# Patient Record
Sex: MALE | Race: WHITE | NOT HISPANIC OR LATINO | URBAN - METROPOLITAN AREA
[De-identification: names, ages, dates, MRNs, and addresses within clinical notes are randomized per-mention and may not be internally consistent; named-entity substitution may affect disease eponyms.]

---

## 2020-08-20 ENCOUNTER — EMERGENCY (EMERGENCY)
Facility: HOSPITAL | Age: 25
LOS: 0 days | Discharge: HOME | End: 2020-08-20
Attending: EMERGENCY MEDICINE | Admitting: EMERGENCY MEDICINE
Payer: COMMERCIAL

## 2020-08-20 VITALS
WEIGHT: 192.9 LBS | HEART RATE: 110 BPM | RESPIRATION RATE: 18 BRPM | DIASTOLIC BLOOD PRESSURE: 66 MMHG | OXYGEN SATURATION: 100 % | TEMPERATURE: 98 F | HEIGHT: 67 IN | SYSTOLIC BLOOD PRESSURE: 132 MMHG
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
## 2020-08-20 DIAGNOSIS — M79.632 PAIN IN LEFT FOREARM: ICD-10-CM
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
## 2020-08-20 DIAGNOSIS — S52.91XA UNSPECIFIED FRACTURE OF RIGHT FOREARM, INITIAL ENCOUNTER FOR CLOSED FRACTURE: ICD-10-CM
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
## 2020-08-20 PROCEDURE — 73130 X-RAY EXAM OF HAND: CPT | Mod: 26,RT

## 2020-08-20 PROCEDURE — 29125 APPL SHORT ARM SPLINT STATIC: CPT

## 2020-08-20 PROCEDURE — 99284 EMERGENCY DEPT VISIT MOD MDM: CPT | Mod: 25

## 2020-08-20 PROCEDURE — 73110 X-RAY EXAM OF WRIST: CPT | Mod: 26,RT

## 2020-08-20 PROCEDURE — 73080 X-RAY EXAM OF ELBOW: CPT | Mod: 26,RT

## 2020-08-20 PROCEDURE — 73090 X-RAY EXAM OF FOREARM: CPT | Mod: 26,RT

## 2020-08-20 NOTE — ED PROVIDER NOTE - CARE PROVIDER_API CALL
Tony Rico  ORTHOPAEDIC SURGERY  [address] [place] [address]  Phone: [phone]  Fax: [phone]  Follow Up Time: 1-3 Days

## 2020-08-20 NOTE — ED ADULT TRIAGE NOTE - CHIEF COMPLAINT QUOTE
Pt was exercising, slipped and fell, 185 lb weight fell on top of him, pt c/o R arm pain, no loc, no head injury. Pt was exercising, slipped and fell, 185 lb weight fell on top of him, pt c/o R arm pain, no loc, no head injury, palpable pulses.

## 2020-08-20 NOTE — ED ADULT NURSE NOTE - CHIEF COMPLAINT QUOTE
Pt was exercising, slipped and fell, 185 lb weight fell on top of him, pt c/o R arm pain, no loc, no head injury, palpable pulses.

## 2020-08-20 NOTE — ED PROVIDER NOTE - PHYSICAL EXAMINATION
Physical Exam    Vital Signs: I have reviewed the initial vital signs  Constitutional: well-nourished, appears stated age, no acute distress  EENT: Conjunctiva pink, Sclera clear, EOMI. Mucous membranes moist, no exudates or lesions noted, uvula midline. Non-tender lymph nodes  Cardiovascular: S1 and S2 present, regular rate, regular rhythm  Respiratory: unlabored respiratory effort, clear to auscultation bilaterally no wheezing, rales and rhonchi  Musculoskeletal: supple nontender neck, no midline tenderness. RUE: rp 2 +, distal ulnar deformity with swelling and ttp. able to move all fingers but guarded in wrist flex/ext and sup/pron. sensation intact. from in elbow, nontender  Integumentary: warm, dry, no rash  Psychiatric: appropriate mood, appropriate affect

## 2020-08-20 NOTE — ED PROVIDER NOTE - CLINICAL SUMMARY MEDICAL DECISION MAKING FREE TEXT BOX
pt presenting with RUE pain after accidentally pinning a weight against his arm while squatting today. No numbness/focal weakness no other complaints or injuries. 2+ equal pulses throughout. <2sec capillary refill throughout. no ttp R clavicle, R shoulder, R humerus, elbow. FROM. +ttp distal radius, no snuffbox tenderness. imaging reviewed. +fx. pt splinted. Comfortable with discharge and follow-up outpatient, strict return precautions given. Endorses understanding of all of this and aware that they can return at any time for new or concerning symptoms. No further questions or concerns at this time

## 2020-08-20 NOTE — ED PROVIDER NOTE - OBJECTIVE STATEMENT
24 year old male no past medical history p/w arm injury. patient was lifitng 185 back squat when his foot slipped from under him and barbell landed on distal right forearm. pain moderate, worse with movement, radiating from distal forearm to 4th and 5th digit, worsening. took motrin with midl rleief. no parestheisas, numbness, weakness. no head trauma/loc

## 2020-08-20 NOTE — ED PROVIDER NOTE - NS ED ROS FT
Review of Systems         Constitutional: (-) fever (-) chills (-) weakness       EENT: (-) sore throat       Cardiovascular: (-) chest pain (-) syncope       Respiratory: (-) cough, (-) shortness of breath       Gastrointestinal: (-) abdominal pain (-) vomiting (-) diarrhea (-) nausea       Musculoskeletal: (-) neck pain (-) back pain (+) joint pain       Integumentary: (-) rash       Neurological: (-) headache (-) altered mental status

## 2020-08-20 NOTE — ED PROVIDER NOTE - PATIENT PORTAL LINK FT
You can access the FollowMyHealth Patient Portal offered by [facility] by registering at the following website: [email or web address]. By joining [facility]’s FollowMyHealth portal, you will also be able to view your health information using other applications (apps) compatible with our system.

## 2020-08-21 NOTE — ED PROCEDURE NOTE - NS ED PERI NEURO NEG
Pre-application: Motor, sensory, and vascular responses intact in the injured extremity./Post-application: Motor, sensory, and vascular responses intact in the injured extremity./The patient/caregiver verbalized understanding of how to care for the injured extremity with splint
203.9

## 2024-09-14 ENCOUNTER — APPOINTMENT (OUTPATIENT)
Dept: ORTHOPEDIC SURGERY | Facility: CLINIC | Age: 29
End: 2024-09-14
Payer: SELF-PAY

## 2024-09-14 DIAGNOSIS — S93.402A SPRAIN OF UNSPECIFIED LIGAMENT OF LEFT ANKLE, INITIAL ENCOUNTER: ICD-10-CM

## 2024-09-14 PROBLEM — Z00.00 ENCOUNTER FOR PREVENTIVE HEALTH EXAMINATION: Status: ACTIVE | Noted: 2024-09-14

## 2024-09-14 PROCEDURE — 73630 X-RAY EXAM OF FOOT: CPT | Mod: LT

## 2024-09-14 PROCEDURE — 99203 OFFICE O/P NEW LOW 30 MIN: CPT

## 2024-09-14 PROCEDURE — 73610 X-RAY EXAM OF ANKLE: CPT | Mod: LT

## 2024-09-14 NOTE — DISCUSSION/SUMMARY
[de-identified] : Left ankle pain  HPI Patient is a 29-year old male accompanied by his mother reports to office for evaluation of left foot/ankle pain since 9/12/2024.  States that he was playing basketball and going up for a rebound when he twisted his left ankle on his way down.  Since then, walking, range of motion, palpating certain areas of the ankle aggravate the patient's pain at times.  Admits to mild swelling.  Denies any numbness or tingling.  Weightbearing left foot and ankle x-rays taken in office today reveal no obvious fractures, subluxations, or dislocations.  Mild soft tissue swelling noted.  Otherwise, no other significant abnormalities were seen.  Left ankle/foot exam is as follows: Mild swelling noted.  No erythema or ecchymosis.  Mild limited ROM of ankle secondary to swelling/pain.  TTP over deltoid and lateral ligaments.  No calf pain.  Light touch intact throughout.  Mild antalgic gait.  Ambulation with crutches  Assessment/plan Explained to the patient that he clinically has an ankle sprain.  Gentle ROM exercises and Epson salt and warm water was encouraged.  Explained to the patient that this may take 4 to 6 weeks to fully heal and that the first 2 weeks are usually the worst.  Advised a tall cam walker boot which the patient has at home.  He may weight-bear as tolerated using his crutches.  The patient was advised to rest/ice the area and may alternate with warm compresses as needed.    OTC NSAIDs as needed for pain.  The foot/ankle conditioning program from the [facility] was given to the patient so they may try that at home.  Follow-up in 3 weeks.  All questions/concerns were answered in detail.

## 2024-10-10 ENCOUNTER — APPOINTMENT (OUTPATIENT)
Dept: ORTHOPEDIC SURGERY | Facility: CLINIC | Age: 29
End: 2024-10-10